# Patient Record
Sex: MALE | Race: WHITE | NOT HISPANIC OR LATINO | URBAN - METROPOLITAN AREA
[De-identification: names, ages, dates, MRNs, and addresses within clinical notes are randomized per-mention and may not be internally consistent; named-entity substitution may affect disease eponyms.]

---

## 2021-04-30 ENCOUNTER — OFFICE (OUTPATIENT)
Dept: URBAN - METROPOLITAN AREA CLINIC 79 | Facility: CLINIC | Age: 51
End: 2021-04-30

## 2021-04-30 VITALS
TEMPERATURE: 97.5 F | HEIGHT: 71 IN | WEIGHT: 279 LBS | HEART RATE: 99 BPM | DIASTOLIC BLOOD PRESSURE: 89 MMHG | SYSTOLIC BLOOD PRESSURE: 128 MMHG

## 2021-04-30 DIAGNOSIS — K62.5 HEMORRHAGE OF ANUS AND RECTUM: ICD-10-CM

## 2021-04-30 DIAGNOSIS — E80.6 OTHER DISORDERS OF BILIRUBIN METABOLISM: ICD-10-CM

## 2021-04-30 DIAGNOSIS — Z86.010 PERSONAL HISTORY OF COLONIC POLYPS: ICD-10-CM

## 2021-04-30 DIAGNOSIS — K64.8 OTHER HEMORRHOIDS: ICD-10-CM

## 2021-04-30 PROCEDURE — 99204 OFFICE O/P NEW MOD 45 MIN: CPT | Performed by: INTERNAL MEDICINE

## 2021-04-30 NOTE — SERVICEHPINOTES
WILLIAM CLEMONS   is a   50   year old male who is being seen in consultation at the request of   CATIE NIXON   for rectal bleeding. He has a history of hemorrhoids and a history of polyps on prior colonoscopies. He is overdue for colonoscopy. He states he recently had swollen hemorrhoid which caused a lot of discomfort and some bleeding. He used some OTC meds and over a few days the hemorrhoid decreased in swelling and currently he is asymptomatic. He is wondering about hemorrhoid banding. He denies any abdominal pain, nausea, vomiting, constipation, melena, loss of appetite, weight loss, dysphagia. He has occasional heartburn if he eats the wrong thing late at night.   He can have diarrhea at times depending on what he eats and states that has happened since his cholecystectomy.